# Patient Record
Sex: MALE | Race: WHITE | NOT HISPANIC OR LATINO | Employment: OTHER | URBAN - METROPOLITAN AREA
[De-identification: names, ages, dates, MRNs, and addresses within clinical notes are randomized per-mention and may not be internally consistent; named-entity substitution may affect disease eponyms.]

---

## 2021-07-06 ENCOUNTER — TELEPHONE (OUTPATIENT)
Dept: GASTROENTEROLOGY | Facility: CLINIC | Age: 71
End: 2021-07-06

## 2021-07-06 ENCOUNTER — PREP FOR PROCEDURE (OUTPATIENT)
Dept: GASTROENTEROLOGY | Facility: CLINIC | Age: 71
End: 2021-07-06

## 2021-07-06 DIAGNOSIS — K22.70 BARRETT'S ESOPHAGUS WITHOUT DYSPLASIA: Primary | ICD-10-CM

## 2021-07-06 NOTE — TELEPHONE ENCOUNTER
I called and scheduled patient for 10/5 EGD with Dr Re Weiner  He was due in 11/2020  I had to send eliquis clearance to Dr Hafsa Kebede in [place] [address] to get permission to stop 1 day prior to his procedure  Will call them in a couple of weeks if not received back

## 2021-07-06 NOTE — TELEPHONE ENCOUNTER
Spoke to pt whom is looking to schedule his recall EGD week of 10/3/21  I told pt that I will call him back to schedule as need to ask health questions that are only valid for a few months  Pt is due for a recall EGD with Dr Dennise Jackson for hx of Mares's

## 2021-07-21 ENCOUNTER — PREP FOR PROCEDURE (OUTPATIENT)
Dept: GASTROENTEROLOGY | Facility: CLINIC | Age: 71
End: 2021-07-21

## 2021-07-21 ENCOUNTER — TELEPHONE (OUTPATIENT)
Dept: GASTROENTEROLOGY | Facility: CLINIC | Age: 71
End: 2021-07-21

## 2021-07-21 DIAGNOSIS — Z86.010 HISTORY OF COLON POLYPS: Primary | ICD-10-CM

## 2021-07-21 NOTE — TELEPHONE ENCOUNTER
This is the pt we spoke about regarding his complaint of abd pain and ct results from ct done in [place]  I faxed the results to the [facility]

## 2021-07-21 NOTE — TELEPHONE ENCOUNTER
I will triage scheduling to set up colonoscopy at time of EGD with Dr Geraldo Saldana  I spoke to patient on phone the CT scan was done 6/09/2021 which showed wall thickening of the sigmoid colon suggestive of a component of chronic diverticulitis  Patient continues to have pain in left lower quadrant  Patient is mainly a resident of [place] but he does return to [place] to have his endoscope is performed by Dr Geraldo Saldana  Advised patient to follow up with his primary care physician in obtain repeat CT imaging to determine if he has diverticulitis  If patient has diverticulitis it should be treated by his primary care physician in [place]  We can still schedule patient for colonoscopy at time of EGD in October with Dr Geraldo Saldana  Thank you

## 2021-07-27 NOTE — TELEPHONE ENCOUNTER
Called and spoke with one of the receptionists that spoke with the nurse at Dr Karen Bateman office to confirm if they received clearance or not  They did not receive, had to refax  Will f/u in a couple of weeks

## 2021-08-10 NOTE — TELEPHONE ENCOUNTER
Called and spoke with Leandra Duran the nurse at Dr Raymundo Russo office  They will not clear him until he is seen for an in person appointment  I called patient and he has a stress test end of August and office visit first week of September  I will f/u in the second week of September if not received back

## 2021-08-17 ENCOUNTER — TELEMEDICINE (OUTPATIENT)
Dept: GASTROENTEROLOGY | Facility: CLINIC | Age: 71
End: 2021-08-17
Payer: MEDICARE

## 2021-08-17 DIAGNOSIS — R93.3 ABNORMAL CT SCAN, GASTROINTESTINAL TRACT: Primary | ICD-10-CM

## 2021-08-17 PROCEDURE — 99442 PR PHYS/QHP TELEPHONE EVALUATION 11-20 MIN: CPT | Performed by: INTERNAL MEDICINE

## 2021-08-17 NOTE — PROGRESS NOTES
Virtual Brief Visit    Verification of patient location:    Patient is located in the following state in which I hold an active license [place]      Assessment/Plan: patient desired to speak regarding abnormal CT scan  He was elsewhere in was noted to have on a CT scan done for left-sided pain some thickening of the sigmoid question chronic diverticulitis  He has no pain over in that area now  He had a follow-up CT scan showed some thickening of the cecum and extensive diverticulosis  With a long discussion  He has an upcoming endoscopy we will had his colonoscopy to that  Problem List Items Addressed This Visit     None                Reason for visit is   Chief Complaint   Patient presents with    Virtual Brief Visit        Encounter provider Darylene Melnick, MD    Provider located at [address]   [facility]  [facility] [id number] [phone]    Recent Visits  No visits were found meeting these conditions  Showing recent visits within past 7 days and meeting all other requirements  Today's Visits  Date Type Provider Dept   08/17/21 Telemedicine Darylene Melnick, MD [address]   Showing today's visits and meeting all other requirements  Future Appointments  No visits were found meeting these conditions  Showing future appointments within next 150 days and meeting all other requirements       After connecting through  A protected cell phone line and patient was informed that this is not a secure, HIPAA-compliant platform  He agrees to proceed  , the patient was identified by name and date of birth  Paty Goncalves was informed that this is a telemedicine visit and that the visit is being conducted through   [employer] and patient was informed that this is not a secure, HIPAA-compliant platform  He agrees to proceed  My office door was closed  No one else was in the room    He acknowledged consent and understanding of privacy and security of the platform  The patient has agreed to participate and understands he can discontinue the visit at any time  Patient is aware this is a billable service  Subjective    Paty Goncalves is a 69 y o  male   Was out of state had a CT scan the abdomen and pelvis showed some thickening of the sigmoid question chronic diverticulitis     A follow-up CT scan showed some thickening of the cecum  HPI     No past medical history on file  No past surgical history on file  No current outpatient medications on file  No current facility-administered medications for this visit  Allergies   Allergen Reactions    Brilinta [Ticagrelor] Rash       Review of Systems    There were no vitals filed for this visit  I spent 17 minutes directly with the patient during this visit    VIRTUAL VISIT DISCLAIMER      Paty Goncalves verbally agrees to participate in [facility]  Pt is aware that [facility] could be limited without vital signs or the ability to perform a full hands-on physical Clista Katalinall understands he or the provider may request at any time to terminate the video visit and request the patient to seek care or treatment in person

## 2021-09-14 NOTE — TELEPHONE ENCOUNTER
Tried calling about clearance and was transferred to the nurses line, was on hold  Will try again tomorrow

## 2021-09-15 NOTE — TELEPHONE ENCOUNTER
Called and spoke with Lois Gregorio at Dr [facility] office in [place]  Patient is scheduled for 9/17 for the stress test and they have the form to clear him  Will f/u next week if  not received back

## 2021-09-20 NOTE — TELEPHONE ENCOUNTER
Cardiac clearance faxed to [facility]  Pt clear and can hold eliquis 2 days  Please call pt  Thank you

## 2021-10-04 RX ORDER — ACETAMINOPHEN 325 MG/1
650 TABLET ORAL EVERY 6 HOURS PRN
COMMUNITY

## 2021-10-04 RX ORDER — AMITRIPTYLINE HYDROCHLORIDE 10 MG/1
10 TABLET, FILM COATED ORAL
COMMUNITY

## 2021-10-04 RX ORDER — LOPERAMIDE HYDROCHLORIDE 2 MG/1
2 CAPSULE ORAL 4 TIMES DAILY PRN
COMMUNITY

## 2021-10-04 RX ORDER — METOPROLOL SUCCINATE 25 MG/1
25 TABLET, EXTENDED RELEASE ORAL DAILY
COMMUNITY

## 2021-10-04 RX ORDER — MULTIVITAMIN
1 TABLET ORAL DAILY
COMMUNITY

## 2021-10-04 RX ORDER — ATORVASTATIN CALCIUM 80 MG/1
80 TABLET, FILM COATED ORAL DAILY
COMMUNITY

## 2021-10-04 RX ORDER — IBUPROFEN 600 MG/1
TABLET ORAL EVERY 6 HOURS PRN
COMMUNITY

## 2021-10-04 RX ORDER — TADALAFIL 20 MG/1
20 TABLET ORAL DAILY PRN
COMMUNITY

## 2021-10-04 RX ORDER — CHLORPHENIRAMINE MALEATE 4 MG/1
4 TABLET ORAL EVERY 6 HOURS PRN
COMMUNITY
End: 2022-03-03

## 2021-10-04 RX ORDER — TAMSULOSIN HYDROCHLORIDE 0.4 MG/1
0.4 CAPSULE ORAL
COMMUNITY

## 2021-10-04 RX ORDER — MULTIVIT-MIN/IRON/FOLIC ACID/K 18-600-40
CAPSULE ORAL
COMMUNITY

## 2021-10-04 RX ORDER — FAMOTIDINE 20 MG/1
20 TABLET, FILM COATED ORAL 2 TIMES DAILY
COMMUNITY
End: 2021-10-05 | Stop reason: HOSPADM

## 2021-10-04 RX ORDER — METOPROLOL SUCCINATE 50 MG/1
50 TABLET, EXTENDED RELEASE ORAL DAILY
COMMUNITY

## 2021-10-05 ENCOUNTER — HOSPITAL ENCOUNTER (OUTPATIENT)
Dept: GASTROENTEROLOGY | Facility: AMBULATORY SURGERY CENTER | Age: 71
Discharge: HOME | End: 2021-10-05
Payer: MEDICARE

## 2021-10-05 ENCOUNTER — ANESTHESIA (OUTPATIENT)
Dept: GASTROENTEROLOGY | Facility: AMBULATORY SURGERY CENTER | Age: 71
End: 2021-10-05

## 2021-10-05 ENCOUNTER — ANESTHESIA EVENT (OUTPATIENT)
Dept: GASTROENTEROLOGY | Facility: AMBULATORY SURGERY CENTER | Age: 71
End: 2021-10-05

## 2021-10-05 VITALS
HEART RATE: 53 BPM | RESPIRATION RATE: 18 BRPM | HEIGHT: 69 IN | DIASTOLIC BLOOD PRESSURE: 65 MMHG | OXYGEN SATURATION: 99 % | BODY MASS INDEX: 26.66 KG/M2 | TEMPERATURE: 96.7 F | WEIGHT: 180 LBS | SYSTOLIC BLOOD PRESSURE: 114 MMHG

## 2021-10-05 DIAGNOSIS — K22.70 BARRETT'S ESOPHAGUS WITHOUT DYSPLASIA: ICD-10-CM

## 2021-10-05 DIAGNOSIS — K57.92 DIVERTICULITIS: ICD-10-CM

## 2021-10-05 DIAGNOSIS — R10.30 LOWER ABDOMINAL PAIN: ICD-10-CM

## 2021-10-05 DIAGNOSIS — K21.00 GASTROESOPHAGEAL REFLUX DISEASE WITH ESOPHAGITIS WITHOUT HEMORRHAGE: Primary | ICD-10-CM

## 2021-10-05 DIAGNOSIS — Z86.010 HISTORY OF COLON POLYPS: ICD-10-CM

## 2021-10-05 PROBLEM — Z86.0101 HX OF ADENOMATOUS COLONIC POLYPS: Status: ACTIVE | Noted: 2021-10-05

## 2021-10-05 PROCEDURE — 45385 COLONOSCOPY W/LESION REMOVAL: CPT | Performed by: INTERNAL MEDICINE

## 2021-10-05 PROCEDURE — 43239 EGD BIOPSY SINGLE/MULTIPLE: CPT | Performed by: INTERNAL MEDICINE

## 2021-10-05 PROCEDURE — 00813 ANES UPR LWR GI NDSC PX: CPT | Performed by: NURSE ANESTHETIST, CERTIFIED REGISTERED

## 2021-10-05 PROCEDURE — 88305 TISSUE EXAM BY PATHOLOGIST: CPT | Performed by: PATHOLOGY

## 2021-10-05 PROCEDURE — 99100 ANES PT EXTEME AGE<1 YR&>70: CPT | Performed by: NURSE ANESTHETIST, CERTIFIED REGISTERED

## 2021-10-05 RX ORDER — SODIUM FLUORIDE 5 MG/ML
PASTE, DENTIFRICE DENTAL
COMMUNITY

## 2021-10-05 RX ORDER — LIDOCAINE HYDROCHLORIDE 20 MG/ML
INJECTION, SOLUTION EPIDURAL; INFILTRATION; INTRACAUDAL; PERINEURAL AS NEEDED
Status: DISCONTINUED | OUTPATIENT
Start: 2021-10-05 | End: 2021-10-05

## 2021-10-05 RX ORDER — PROPOFOL 10 MG/ML
INJECTION, EMULSION INTRAVENOUS AS NEEDED
Status: DISCONTINUED | OUTPATIENT
Start: 2021-10-05 | End: 2021-10-05

## 2021-10-05 RX ORDER — SODIUM CHLORIDE 9 MG/ML
30 INJECTION, SOLUTION INTRAVENOUS CONTINUOUS
Status: DISCONTINUED | OUTPATIENT
Start: 2021-10-05 | End: 2021-10-09 | Stop reason: HOSPADM

## 2021-10-05 RX ORDER — SODIUM CHLORIDE 9 MG/ML
20 INJECTION, SOLUTION INTRAVENOUS CONTINUOUS
Status: DISCONTINUED | OUTPATIENT
Start: 2021-10-05 | End: 2021-10-09 | Stop reason: HOSPADM

## 2021-10-05 RX ORDER — OMEPRAZOLE 20 MG/1
20 CAPSULE, DELAYED RELEASE ORAL DAILY
Qty: 30 CAPSULE | Refills: 3 | Status: SHIPPED | OUTPATIENT
Start: 2021-10-05 | End: 2022-01-17 | Stop reason: SDUPTHER

## 2021-10-05 RX ADMIN — PROPOFOL 30 MG: 10 INJECTION, EMULSION INTRAVENOUS at 08:14

## 2021-10-05 RX ADMIN — PROPOFOL 50 MG: 10 INJECTION, EMULSION INTRAVENOUS at 08:29

## 2021-10-05 RX ADMIN — PROPOFOL 30 MG: 10 INJECTION, EMULSION INTRAVENOUS at 08:38

## 2021-10-05 RX ADMIN — LIDOCAINE HYDROCHLORIDE 50 MG: 20 INJECTION, SOLUTION EPIDURAL; INFILTRATION; INTRACAUDAL; PERINEURAL at 08:30

## 2021-10-05 RX ADMIN — LIDOCAINE HYDROCHLORIDE 50 MG: 20 INJECTION, SOLUTION EPIDURAL; INFILTRATION; INTRACAUDAL; PERINEURAL at 08:05

## 2021-10-05 RX ADMIN — PROPOFOL 130 MG: 10 INJECTION, EMULSION INTRAVENOUS at 08:05

## 2021-10-05 RX ADMIN — PROPOFOL 20 MG: 10 INJECTION, EMULSION INTRAVENOUS at 08:18

## 2021-10-05 RX ADMIN — PROPOFOL 50 MG: 10 INJECTION, EMULSION INTRAVENOUS at 08:31

## 2021-10-05 RX ADMIN — PROPOFOL 30 MG: 10 INJECTION, EMULSION INTRAVENOUS at 08:23

## 2021-10-05 RX ADMIN — PROPOFOL 50 MG: 10 INJECTION, EMULSION INTRAVENOUS at 08:16

## 2021-10-05 RX ADMIN — PROPOFOL 50 MG: 10 INJECTION, EMULSION INTRAVENOUS at 08:11

## 2021-10-05 RX ADMIN — SODIUM CHLORIDE: 9 INJECTION, SOLUTION INTRAVENOUS at 07:59

## 2021-10-05 RX ADMIN — PROPOFOL 20 MG: 10 INJECTION, EMULSION INTRAVENOUS at 08:26

## 2021-10-05 RX ADMIN — PROPOFOL 50 MG: 10 INJECTION, EMULSION INTRAVENOUS at 08:33

## 2021-10-05 RX ADMIN — PROPOFOL 20 MG: 10 INJECTION, EMULSION INTRAVENOUS at 08:07

## 2022-01-16 DIAGNOSIS — K22.70 BARRETT'S ESOPHAGUS WITHOUT DYSPLASIA: ICD-10-CM

## 2022-01-16 DIAGNOSIS — K21.00 GASTROESOPHAGEAL REFLUX DISEASE WITH ESOPHAGITIS WITHOUT HEMORRHAGE: ICD-10-CM

## 2022-01-17 DIAGNOSIS — K22.70 BARRETT'S ESOPHAGUS WITHOUT DYSPLASIA: ICD-10-CM

## 2022-01-17 DIAGNOSIS — K21.00 GASTROESOPHAGEAL REFLUX DISEASE WITH ESOPHAGITIS WITHOUT HEMORRHAGE: ICD-10-CM

## 2022-01-17 RX ORDER — OMEPRAZOLE 20 MG/1
CAPSULE, DELAYED RELEASE ORAL
Qty: 30 CAPSULE | Refills: 3 | Status: SHIPPED | OUTPATIENT
Start: 2022-01-17 | End: 2022-04-04 | Stop reason: SDUPTHER

## 2022-01-17 RX ORDER — OMEPRAZOLE 20 MG/1
20 CAPSULE, DELAYED RELEASE ORAL DAILY
Qty: 90 CAPSULE | Refills: 3 | Status: SHIPPED | OUTPATIENT
Start: 2022-01-17 | End: 2022-03-03

## 2022-03-03 ENCOUNTER — TELEMEDICINE (OUTPATIENT)
Dept: GASTROENTEROLOGY | Facility: CLINIC | Age: 72
End: 2022-03-03
Payer: MEDICARE

## 2022-03-03 VITALS — WEIGHT: 183 LBS | HEIGHT: 69 IN | BODY MASS INDEX: 27.11 KG/M2

## 2022-03-03 DIAGNOSIS — R10.30 LOWER ABDOMINAL PAIN: Primary | ICD-10-CM

## 2022-03-03 DIAGNOSIS — R93.3 ABNORMAL CT SCAN, GASTROINTESTINAL TRACT: ICD-10-CM

## 2022-03-03 PROCEDURE — 1124F ACP DISCUSS-NO DSCNMKR DOCD: CPT | Performed by: INTERNAL MEDICINE

## 2022-03-03 PROCEDURE — 99213 OFFICE O/P EST LOW 20 MIN: CPT | Performed by: INTERNAL MEDICINE

## 2022-03-03 RX ORDER — LEVOCETIRIZINE DIHYDROCHLORIDE 5 MG/1
TABLET, FILM COATED ORAL
COMMUNITY
Start: 2021-12-01

## 2022-03-03 RX ORDER — DICYCLOMINE HCL 20 MG
TABLET ORAL
COMMUNITY
Start: 2022-02-21

## 2022-03-03 NOTE — PROGRESS NOTES
Virtual Regular Visit    Verification of patient location:    Patient is located in the following state in which I hold an active license [place]      Assessment/Plan:  Suspect a musculoskeletal component  Discussed possible evaluation by orthopedist   Kavitha Eagle with primary care consider it warm moist heat anti inflammatory and possibly some exercises  He will keep me posted of his progress  He is now down at [place]  Should condition worsen her persist please follow-up  Next colonoscopy is due in October of 2026  Problem List Items Addressed This Visit     None      Visit Diagnoses     Lower abdominal pain    -  Primary    Abnormal CT scan, gastrointestinal tract                   Reason for visit is   Chief Complaint   Patient presents with    Follow-up     lower left quadrant pain - pcp put patient on dyclomine     Virtual Regular Visit        Encounter provider Masoud Espana MD    Provider located at [address]      Recent Visits  No visits were found meeting these conditions  Showing recent visits within past 7 days and meeting all other requirements  Today's Visits  Date Type Provider Dept   03/03/22 Telemedicine Masoud Espana MD [address]   Showing today's visits and meeting all other requirements  Future Appointments  No visits were found meeting these conditions  Showing future appointments within next 150 days and meeting all other requirements       The patient was identified by name and date of birth  Jeff Bolañoskman was informed that this is a telemedicine visit and that the visit is being conducted through [address] [facility] and patient was informed that this is a secure, HIPAA-compliant platform  He agrees to proceed     My office door was closed  No one else was in the room    He acknowledged consent and understanding of privacy and security of the video platform  The patient has agreed to participate and understands they can discontinue the visit at any time  Patient is aware this is a billable service  Subjective  Og Seymour is a 66 y o  male with 8 months of some very vague pain to the right of his tuberosity of the ileum  Sometimes radiates to his groin  Brought on by certain motions  He exercises regularly but avoid using his abdominal muscles during that  He does upper body strength and runs  Most recent CT scan showed some asymmetry of the inguinal canals and thickened sigmoid  He did have a colonoscopy in October tubular adenoma was removed  Diverticulosis was noted  Next colonoscopy 5 years  Jairo Acevedo     Past Medical History:   Diagnosis Date    Abnormal finding on CT scan 5/2021 and 8/2021    thickening of colon wall    Mares's esophagus     Colon polyp     Diarrhea     intermittent, every week or two, takes Imodium    Enlarged prostate     GERD (gastroesophageal reflux disease)     Herniated disc, cervical     Hyperlipidemia     Hypertension     Irregular heart beat     afib    Seasonal allergies     Sleep apnea     uses cpap       Past Surgical History:   Procedure Laterality Date    ANGIOPLASTY  2017    3 stents       Current Outpatient Medications   Medication Sig Dispense Refill    acetaminophen (TYLENOL) 325 mg tablet Take 650 mg by mouth every 6 (six) hours as needed for mild pain      amitriptyline (ELAVIL) 10 mg tablet Take 10 mg by mouth daily at bedtime      apixaban (Eliquis) 5 mg Take 5 mg by mouth 2 (two) times a day      Ascorbic Acid (Vitamin C) 500 MG CAPS Take by mouth      Aspirin 81 MG CAPS Take by mouth      atorvastatin (LIPITOR) 80 mg tablet Take 80 mg by mouth daily      dicyclomine (BENTYL) 20 mg tablet       ibuprofen (MOTRIN) 600 mg tablet Take by mouth every 6 (six) hours as needed for mild pain      levocetirizine (XYZAL) 5 MG tablet       loperamide (IMODIUM) 2 mg capsule Take 2 mg by mouth 4 (four) times a day as needed for diarrhea      metoprolol succinate (TOPROL-XL) 25 mg 24 hr tablet Take 25 mg by mouth daily      metoprolol succinate (TOPROL-XL) 50 mg 24 hr tablet Take 50 mg by mouth daily      Multiple Vitamin (multivitamin) tablet Take 1 tablet by mouth daily      omeprazole (PriLOSEC) 20 mg delayed release capsule TAKE 1 CAPSULE BY MOUTH  DAILY 30 capsule 3    SODIUM FLUORIDE, DENTAL GEL, 1 1 % CREA Apply to teeth      tadalafil (CIALIS) 20 MG tablet Take 20 mg by mouth daily as needed for erectile dysfunction      tamsulosin (FLOMAX) 0 4 mg Take 0 4 mg by mouth daily with dinner       No current facility-administered medications for this visit  Allergies   Allergen Reactions    Brilinta [Ticagrelor] Rash       Review of Systems    Video Exam    Vitals:    03/03/22 0752   Weight: 83 kg (183 lb)   Height: 5' 9" (1 753 m)       Physical Exam     No apparent distress abdomen appears benig  I spent 11 minutes 31 seconds directly speaking with the patient    VIRTUAL VISIT DISCLAIMER      Juana Rebolledo verbally agrees to participate in [employer]  Pt is aware that [employer] could be limited without vital signs or the ability to perform a full hands-on physical Abduldo Carvajal understands he or the provider may request at any time to terminate the video visit and request the patient to seek care or treatment in person

## 2022-04-04 DIAGNOSIS — K22.70 BARRETT'S ESOPHAGUS WITHOUT DYSPLASIA: ICD-10-CM

## 2022-04-04 DIAGNOSIS — K21.00 GASTROESOPHAGEAL REFLUX DISEASE WITH ESOPHAGITIS WITHOUT HEMORRHAGE: ICD-10-CM

## 2022-04-04 RX ORDER — OMEPRAZOLE 20 MG/1
20 CAPSULE, DELAYED RELEASE ORAL DAILY
Qty: 30 CAPSULE | Refills: 3 | Status: SHIPPED | OUTPATIENT
Start: 2022-04-04 | End: 2022-07-24

## 2022-07-24 DIAGNOSIS — K22.70 BARRETT'S ESOPHAGUS WITHOUT DYSPLASIA: ICD-10-CM

## 2022-07-24 DIAGNOSIS — K21.00 GASTROESOPHAGEAL REFLUX DISEASE WITH ESOPHAGITIS WITHOUT HEMORRHAGE: ICD-10-CM

## 2022-07-24 RX ORDER — OMEPRAZOLE 20 MG/1
CAPSULE, DELAYED RELEASE ORAL
Qty: 30 CAPSULE | Refills: 11 | Status: SHIPPED | OUTPATIENT
Start: 2022-07-24

## 2023-06-17 DIAGNOSIS — K22.70 BARRETT'S ESOPHAGUS WITHOUT DYSPLASIA: ICD-10-CM

## 2023-06-17 DIAGNOSIS — K21.00 GASTROESOPHAGEAL REFLUX DISEASE WITH ESOPHAGITIS WITHOUT HEMORRHAGE: ICD-10-CM

## 2023-06-17 RX ORDER — OMEPRAZOLE 20 MG/1
CAPSULE, DELAYED RELEASE ORAL
Qty: 90 CAPSULE | Refills: 3 | Status: SHIPPED | OUTPATIENT
Start: 2023-06-17

## 2024-02-27 ENCOUNTER — TELEPHONE (OUTPATIENT)
Dept: SURGERY | Facility: CLINIC | Age: 74
End: 2024-02-27

## 2024-05-01 ENCOUNTER — OFFICE VISIT (OUTPATIENT)
Dept: GASTROENTEROLOGY | Facility: CLINIC | Age: 74
End: 2024-05-01
Payer: MEDICARE

## 2024-05-01 ENCOUNTER — TELEPHONE (OUTPATIENT)
Dept: GASTROENTEROLOGY | Facility: CLINIC | Age: 74
End: 2024-05-01

## 2024-05-01 VITALS
WEIGHT: 315 LBS | HEART RATE: 59 BPM | HEIGHT: 69 IN | SYSTOLIC BLOOD PRESSURE: 132 MMHG | BODY MASS INDEX: 46.65 KG/M2 | DIASTOLIC BLOOD PRESSURE: 66 MMHG

## 2024-05-01 DIAGNOSIS — Z80.0 FAMILY HISTORY OF COLON CANCER: ICD-10-CM

## 2024-05-01 DIAGNOSIS — Z86.010 HX OF ADENOMATOUS COLONIC POLYPS: ICD-10-CM

## 2024-05-01 DIAGNOSIS — K21.00 GASTROESOPHAGEAL REFLUX DISEASE WITH ESOPHAGITIS WITHOUT HEMORRHAGE: ICD-10-CM

## 2024-05-01 DIAGNOSIS — K22.70 BARRETT'S ESOPHAGUS WITHOUT DYSPLASIA: ICD-10-CM

## 2024-05-01 DIAGNOSIS — R19.7 DIARRHEA, UNSPECIFIED TYPE: Primary | ICD-10-CM

## 2024-05-01 DIAGNOSIS — K21.9 GASTROESOPHAGEAL REFLUX DISEASE, UNSPECIFIED WHETHER ESOPHAGITIS PRESENT: ICD-10-CM

## 2024-05-01 PROCEDURE — 99204 OFFICE O/P NEW MOD 45 MIN: CPT | Performed by: NURSE PRACTITIONER

## 2024-05-01 RX ORDER — OMEPRAZOLE 20 MG/1
20 CAPSULE, DELAYED RELEASE ORAL DAILY
Qty: 90 CAPSULE | Refills: 3 | Status: SHIPPED | OUTPATIENT
Start: 2024-05-01

## 2024-05-01 RX ORDER — AMLODIPINE BESYLATE 5 MG/1
5 TABLET ORAL DAILY
COMMUNITY
Start: 2024-04-13

## 2024-05-01 NOTE — PROGRESS NOTES
[facility] - Outpatient Follow-up Note  Kalpesh Alonzo 73 y.o. male MRN: [id number]  Encounter: [id number]          ASSESSMENT AND PLAN:      1. Diarrhea, unspecified type  Pt reports hx of chronic diarrhea with associated cramping for the last 20 years.  He has tried 2 medications in the past which didn't work, but doesn't recall what they were. He did have improvement with Imodium and has been taking 1 mg BID with formed BMs, but over the last 1-2 years is having recurrent diarrhea every 5-6 days which is now yellow, foul smelling, and oily & different than his prior diarrhea. Denies any nocturnal symptoms, rectal bleeding, weight loss, bloating, nausea . Reports dairy free/gluten free trials as well as probiotics were unsuccessful.     -Obtain celiac panel, TSH, fecal elastase, giardia  -Recent CBC normal in Nov 2023, CMP normal w/ the exception of slightly elevated Bilirubin  -Will schedule colonoscopy to evaluate for microscopic colitis   -If workup benign, consider Xifaxan for treatment of IBS-D  -Pt will trial Metamucil daily, avoidance of artificial sugars, & Low fodmap diet in the interim  -     Celiac Disease Panel; Future  -     TSH, 3rd generation with Free T4 reflex; Future  -     Pancreatic elastase, fecal; Future  -     Giardia antigen; Future  -     Colonoscopy; Future; Expected date: 05/01/2024    2. Gastroesophageal reflux disease, unspecified whether esophagitis present  3. Juarez's esophagus without dysplasia  Controlled on Prilosec 20mg daily with breakthrough symptoms once a month. Due for EGD for juarez's surveillance this year which we will schedule.  -     EGD; Future; Expected date: 05/01/2024    4. Hx of adenomatous colonic polyps  History of TA polyp removed in October 2021 with recall recommended in 5 years. Due to diarrhea with change in bowel habit will schedule this earlier with EGD.  -     Colonoscopy; Future; Expected date: 05/01/2024    5. Family  history of colon cancer in father  Diagnosed at age 45  -     Colonoscopy; Future; Expected date: 05/01/2024        ______________________________________________________________________    SUBJECTIVE:  Kalpesh Alonzo is a 73 y.o. male with history of HLD, HTN, A-fib on Eliquis, CAD with stent placement 2017, BPH, PABLO, GERD, Mares's esophagus, cervical disc disease presenting for evaluation of diarrhea.     Has had diarrhea for 20 years, was prescribed 2 medications, and they didn't work. Since then, he's been taking Imodium 1 mg in the morning and 1 mg in the afternoon which has seemingly worked for him. He is now having diarrhea every 5-6 days. Describes the diarrhea as foul smelling, yellow, oily. Gets abdominal cramping with the diarrhea which resolves afterwards. Denies blood in the stool, nocturnal symptoms, unintended weight loss. Drinks Coke zero 1-2 times a day, 1-2 cups of coffee a day. Typically only eats one meal a day cheese and crackers with cocktails in the afternoon and then meat & veggies for dinner. Denies any recent antibiotic use.     His last EGD was in October 2021 showing antritis and short segment Mares's.  He had a fundic polyp removed and had a fundic diverticulum.  Repeat was recommended in 3 years. Biopsies c/w chronic inactive gastritis (no h.pylori), fundic gland polyp, EG junction w/o intestinal metaplasia, esophageal body w/o eosinophils.  Colonoscopy done at that time with diverticulosis, internal hemorrhoids, and a tubular adenoma removed and repeat recommended in 5 years.    Drinks 2-3 ounces of whiskey, 1 beer, or 2 glasses of wine a day  No tobacco use or recreational drug use  + family hx of colon cancer in father at age 45. No history of IBD or celiac disease that he's aware of    Follows with Dr. Taylor JEREZ cardiology          REVIEW OF SYSTEMS IS OTHERWISE NEGATIVE.      Historical Information   Past Medical History:   Diagnosis Date    Abnormal finding on CT scan  "5/2021 and 8/2021    thickening of colon wall    Mares's esophagus     Colon polyp     Diarrhea     intermittent, every week or two, takes Imodium    Enlarged prostate     GERD (gastroesophageal reflux disease)     Herniated disc, cervical     Hyperlipidemia     Hypertension     Irregular heart beat     afib    Seasonal allergies     Sleep apnea     uses cpap     Past Surgical History:   Procedure Laterality Date    ANGIOPLASTY  2017    3 stents     Social History   Social History     Substance and Sexual Activity   Alcohol Use Yes    Comment: 2 drinks/day     Social History     Substance and Sexual Activity   Drug Use Never     Social History     Tobacco Use   Smoking Status Never   Smokeless Tobacco Never     Family History   Problem Relation Age of Onset    Cancer Father 45        colon    Cancer Paternal Uncle 65        throat       Meds/Allergies       Current Outpatient Medications:     acetaminophen (TYLENOL) 325 mg tablet    amitriptyline (ELAVIL) 10 mg tablet    amLODIPine (NORVASC) 5 mg tablet    apixaban (Eliquis) 5 mg    Ascorbic Acid (Vitamin C) 500 MG CAPS    Aspirin 81 MG CAPS    atorvastatin (LIPITOR) 80 mg tablet    levocetirizine (XYZAL) 5 MG tablet    loperamide (IMODIUM) 2 mg capsule    metoprolol succinate (TOPROL-XL) 50 mg 24 hr tablet    Multiple Vitamin (multivitamin) tablet    omeprazole (PriLOSEC) 20 mg delayed release capsule    SODIUM FLUORIDE, DENTAL GEL, 1.1 % CREA    tadalafil (CIALIS) 20 MG tablet    tamsulosin (FLOMAX) 0.4 mg    dicyclomine (BENTYL) 20 mg tablet    ibuprofen (MOTRIN) 600 mg tablet    metoprolol succinate (TOPROL-XL) 25 mg 24 hr tablet    Allergies   Allergen Reactions    Brilinta [Ticagrelor] Rash    Iodine - Food Allergy Rash     Allergy to IV Contrast, upper body rash/swelling. Would need benadryl before administration of contrast. Pt denies airway symptoms.           Objective     Blood pressure 132/66, pulse 59, height 5' 9\" (1.753 m), weight (!) 359 kg (791 lb " 12.8 oz). Body mass index is 116.93 kg/m².      PHYSICAL EXAM:      General Appearance:   Alert, cooperative, no distress   HEENT:   Normocephalic, atraumatic, anicteric.     Neck:  Supple, symmetrical, trachea midline   Lungs:   Clear to auscultation bilaterally; no rales, rhonchi or wheezing; respirations unlabored    Heart::   Regular rate and rhythm; no murmur.   Abdomen:   Soft, non-tender, non-distended; normal bowel sounds; no masses, no organomegaly    Genitalia:   Deferred    Rectal:   Deferred    Extremities:  No cyanosis, clubbing or edema    Skin:  No jaundice, rashes, or lesions    Lymph nodes:  No palpable cervical lymphadenopathy        Lab Results:   No visits with results within 1 Day(s) from this visit.   Latest known visit with results is:   Hospital Outpatient Visit on 10/05/2021   Component Date Value    Case Report 10/05/2021                      Value:Surgical Pathology Report                         Case: [id number]                                   Authorizing Provider:  Sandoval Solares MD           Collected:           10/05/2021 0822              Ordering Location:     [facility] Received:            10/05/2021 2103                                     [place]                                                                  Pathologist:           Jean Pierre Prieto MD                                                         Specimens:   A) - Stomach, ANTRUM...cold bx..check for Hpy                                                       B) - Polyp, Stomach/Small Intestine, FUNDUS...cold bx...polyp x1                                    C) - Esophagus, EG JUNCTION...cold bx...Barretts                                                    D) - Esophagus, BODY OF ESOPH...cold bx..check for EOE                                              E) - Esophagus, UPPER ESOPH...cold bx...inlet patch                                                                           F) - Large Intestine,  Transverse Colon, TRANSVERSE..cold snare...polyp x1                  Final Diagnosis 10/05/2021                      Value:This result contains rich text formatting which cannot be displayed here.    Additional Information 10/05/2021                      Value:This result contains rich text formatting which cannot be displayed here.    Synoptic Checklist 10/05/2021                      Value:  (COLON/RECTUM POLYP FORM - GI - F)                                                                                                                 :    Adenoma(s)      Gross Description 10/05/2021                      Value:This result contains rich text formatting which cannot be displayed here.         Radiology Results:   No results found.

## 2024-05-01 NOTE — PATIENT INSTRUCTIONS
-Decrease use of artificial sugars  -Start Metamucil daily to add bulk to your stool  -You can continue the imodium     Low FODMAPs Diet     You need a diet that limits, but does not eliminate foods that contain: lactose, fructose, fructans, galactans, and sugar alcohols (polyols).  This is often referred to as a low FODMAPs diet as it is low in fermetable oligo-, di-, and monosaccharides and polyls (FODMAPs).  The low FODMAPs diet will help minimize symptoms, such as bloating, cramping, burping, flatulence, and constipation and/or diarrhea, that are often associated with Irritable Bowel Syndrome (IBS).  Because there are different levels of intolerance, you need to eliminate foods high in FODMAPs for 6-8 weeks and then gradually reintroduce foods to identify bothersome foods.  Reintroduce one food every four days with a 2-week break between bothersome foods.  The end result is to identify the threshold that you are able to consume FODMAP containing foods without causing bothersome GI symptoms.     Type of Food High in FODMAPs Low in FODMAPs   Milk -Milk: Cow, Sheep, Goat, Soy  -Creamy soups made with    milk  -Evaporated milk  -Sweetened condensed milk -Milk: [place], Coconut, Hazelnut, Hemp, Rice  -Lactose free cow's milk  -Lactose free joaquín  -Lactose free ice cream    (non-dairy alternatives)  -Purchase lactase enzyme to  make your own evaporated or  condensed milk if needed   Yogurt -Cow's milk yogurt (Greek yogurt is lowest in FODMAPs)  -Soy yogurt -Coconut milk yogurt   Cheese -Cottage cheese  -Ricotta cheese  -Marscapone cheese -Hard cheeses including  cheddar, Swiss, brie, blue  cheese, mozzarella, parmesan,  and feta  -No more than 2 tablespoons ricotta or cottage cheese  -Lactose free cottage cheese   Dairy-based  Condiments -Sour cream  -Whipping cream -Butter  -Half and half  -Cream cheese   Dairy-based desserts -Ice cream  -Frozen yogurt  -Sherbet -Sorbet from FODMAPs friendly fruit   Fruit -Apples,  Pears  -Cherries, Raspberries,  Blackberries  -Watermelon  -Nectarines, White peaches, Apricots, Plums  -Peaches  -Prunes  -Sidon, Papaya  -Persimmon  -Orange juice  -Canned fruit  -Large portions of any fruit -Banana  -Blueberries, Strawberries  -Cantaloupe, Honeydew  -Grapefruit, Lemon, Lime  -Grapes  -Kiwi  -Pineapple  -Rhubarb  -Tangelos  - <1/4 avocado  - <1 tablespoon dried fruit     Limit consumption to one low FODMAPs fruit per meal.  Consume ripe fruit. (Firm, less- ripe fruit contains more fructose.)   Vegetables -Artichokes  -Asparagus  -Sugar snap peas  -Cabbage  -Onions  -Shallot  -Amol  -Onion and garlic salt  powders  -Garlic  -Cauliflower  -Mushrooms  -Pumpkin  -Green Peppers -Bok marilyn, Bean sprouts  -Red bell pepper  -Lettuce, Spinach  -Carrots  -Chives, Spring onion (green part    Only)  -Cucumber  -Eggplant  -Green beans  -Tomato  -Potatoes  -Garlic infused oil; purchase    flavored oil or saute onion and    garlic in oil and then discard    onion and garlic  -Water chestnuts  -<1 stick celery  -<1/2 cup sweet potato, broccoli,    [place] sprouts, butternut    squash, fennel  -<1/3 cup green peas  -<10 snow peas         Grains -Wheat  -Rye  -Barley-large quantities  -Spelt -Brown rice  -Oats, oat bran  -Quinoa  -Corn  -Gluten-free bread, cereals,    pastas and crackers without    honey, apple/pear juice, agave    or HFCS  -Namaste Food Perfect Flour  Blend or Casimiro Gm Gluten Free  -Multi-Purpose Flour   Legumes -Chickpeas, Hummus  -Kidney beans, Baked beans  -Edamame  -Lentils  -Soy milk -Tofu  -Peanuts   Nuts and Seeds -Pistachios -10-15 max or 1-2 tablespoons of Almonds, Macadamia, Pecans,  Pine nuts, Walnuts, Pumpkin  Seed, Sesame seeds, and  Sunflower seeds   Sweeteners -Honey  -Agave  -High fructose corn syrup  -Sorbitol, Mannitol, Xylitol,  Maltitol  -Splenda (may alter friendly  gut ivana) -Sugar  -Glucose, Sucrose  -Pure maple syrup  -Aspartame   Additives -Inulin, found in yogurt,  joaquín, cereals, and other foods with added fiber  -FOS    (fructo- oligosaccharides)  -Sugar alcohols (see sweeteners)  -Chicory root    Alcohol -Rum -Wine, Beer  -Vodka, Gin  -Limit to one serving as all  alcohol is a gastric irritant   Protein-rich food  -Fish, Chicken, Turkey, Eggs,  Meat   Fat-rich food  -Olive and canola oil  -Olives  -<1/4 avocado      Sample Low FODMAPs Diet Menu:     Breakfast  [place] Corn Flakes or oats, with rice or almond milk, banana and 1 tablespoon sliced almonds  Diggs's or [employer] oatmeal with 1 tablespoon dried fruit and nuts  Quinoa flakes with rice or almond milk, 3/4 cup strawberries, and 1 tablespoon pecans     Lunch  Taj's white bread sandwich with sliced turkey, lettuce or spinach leaves, tomato, sliced cheddar cheese and [place] lactose-free vanilla yogurt, 1/2 cup blueberries and baby carrots  Stir cannon with brown rice or rice noodles, chicken, shrimp, or beef, peppers and bok marilyn, ask for no onion or garlic and the sauce on the side  Fruit salad with 1 cup (total) low FODMAP fruits, kiwi, strawberries and blueberries, spinach salad with lemon dressing and cherry tomatoes, and brown rice cakes with natural almond butter     Snack  Glutino pretzels or Blue Prabha [place] Nut thins and mozzarella string cheese  Hard boiled egg and cherry tomatoes  Pumpkin seeds  Brown rice cakes with natural peanut butter  Banana and handful almonds  1 stick celery with natural almond butter or,  Carrots and red pepper dipped in tahini     Dinner  Grilled chicken or salmon with baked sweet potato with olive oil or butter, sauteed spinach and red peppers seasoned with green parts of onion, salt, pepper, handful of pine nuts and olive oil, and a kiwi  Bere's baked potato and a side salad with chicken, bring your own homemade salad dressing that does not contain garlic or onion  [place]

## 2024-05-01 NOTE — TELEPHONE ENCOUNTER
Scheduled date of EGD/colonoscopy (as of today): 8/8/24  Physician performing EGD/colonoscopy: Dr. Higgins  Location of EGD/colonoscopy:   Desired bowel prep reviewed with patient: miralax  Instructions reviewed with patient by: tl given at appt  Clearances:      Eliquis- 2 day hold  Dr. Jazzy JEREZ

## 2024-07-25 ENCOUNTER — TELEPHONE (OUTPATIENT)
Dept: GASTROENTEROLOGY | Facility: CLINIC | Age: 74
End: 2024-07-25

## 2024-07-25 NOTE — TELEPHONE ENCOUNTER
---- Message -----   From: Jackelyn Gamez LPN   Sent: 7/25/2024  10:14 AM EDT   To: [facility]   Subject: Eliquis hold                                     Unable to tell if approval was obtained for patient to hold Eliquis 2 days prior to procedure. Patient also is unaware of any hold time. Please clarify and notify patient, thank you!     I sent cardiac clearance to Dr Vazquez's office , will await a response. Sb

## 2024-08-07 RX ORDER — LIDOCAINE HYDROCHLORIDE 10 MG/ML
0.5 INJECTION, SOLUTION EPIDURAL; INFILTRATION; INTRACAUDAL; PERINEURAL ONCE AS NEEDED
Status: CANCELLED | OUTPATIENT
Start: 2024-08-07

## 2024-08-07 RX ORDER — SODIUM CHLORIDE, SODIUM LACTATE, POTASSIUM CHLORIDE, CALCIUM CHLORIDE 600; 310; 30; 20 MG/100ML; MG/100ML; MG/100ML; MG/100ML
125 INJECTION, SOLUTION INTRAVENOUS CONTINUOUS
Status: CANCELLED | OUTPATIENT
Start: 2024-08-07

## 2024-08-08 ENCOUNTER — HOSPITAL ENCOUNTER (OUTPATIENT)
Dept: GASTROENTEROLOGY | Facility: HOSPITAL | Age: 74
Setting detail: OUTPATIENT SURGERY
End: 2024-08-08
Payer: MEDICARE

## 2024-08-08 ENCOUNTER — ANESTHESIA (OUTPATIENT)
Dept: GASTROENTEROLOGY | Facility: HOSPITAL | Age: 74
End: 2024-08-08

## 2024-08-08 ENCOUNTER — ANESTHESIA EVENT (OUTPATIENT)
Dept: GASTROENTEROLOGY | Facility: HOSPITAL | Age: 74
End: 2024-08-08

## 2024-08-08 VITALS
OXYGEN SATURATION: 94 % | WEIGHT: 181.8 LBS | SYSTOLIC BLOOD PRESSURE: 102 MMHG | HEART RATE: 57 BPM | DIASTOLIC BLOOD PRESSURE: 57 MMHG | TEMPERATURE: 97.8 F | BODY MASS INDEX: 26.93 KG/M2 | HEIGHT: 69 IN | RESPIRATION RATE: 16 BRPM

## 2024-08-08 DIAGNOSIS — K44.9 HIATAL HERNIA: Primary | ICD-10-CM

## 2024-08-08 DIAGNOSIS — Z86.010 HX OF ADENOMATOUS COLONIC POLYPS: ICD-10-CM

## 2024-08-08 DIAGNOSIS — Z80.0 FAMILY HISTORY OF COLON CANCER: ICD-10-CM

## 2024-08-08 DIAGNOSIS — K21.9 GASTROESOPHAGEAL REFLUX DISEASE, UNSPECIFIED WHETHER ESOPHAGITIS PRESENT: ICD-10-CM

## 2024-08-08 DIAGNOSIS — K22.70 BARRETT'S ESOPHAGUS WITHOUT DYSPLASIA: ICD-10-CM

## 2024-08-08 DIAGNOSIS — R19.7 DIARRHEA, UNSPECIFIED TYPE: ICD-10-CM

## 2024-08-08 PROCEDURE — G0105 COLORECTAL SCRN; HI RISK IND: HCPCS | Performed by: INTERNAL MEDICINE

## 2024-08-08 PROCEDURE — 88305 TISSUE EXAM BY PATHOLOGIST: CPT | Performed by: STUDENT IN AN ORGANIZED HEALTH CARE EDUCATION/TRAINING PROGRAM

## 2024-08-08 PROCEDURE — 43239 EGD BIOPSY SINGLE/MULTIPLE: CPT | Performed by: INTERNAL MEDICINE

## 2024-08-08 RX ORDER — PROPOFOL 10 MG/ML
INJECTION, EMULSION INTRAVENOUS AS NEEDED
Status: DISCONTINUED | OUTPATIENT
Start: 2024-08-08 | End: 2024-08-08

## 2024-08-08 RX ORDER — SODIUM CHLORIDE, SODIUM LACTATE, POTASSIUM CHLORIDE, CALCIUM CHLORIDE 600; 310; 30; 20 MG/100ML; MG/100ML; MG/100ML; MG/100ML
INJECTION, SOLUTION INTRAVENOUS CONTINUOUS PRN
Status: DISCONTINUED | OUTPATIENT
Start: 2024-08-08 | End: 2024-08-08

## 2024-08-08 RX ORDER — LIDOCAINE HYDROCHLORIDE 20 MG/ML
INJECTION, SOLUTION EPIDURAL; INFILTRATION; INTRACAUDAL; PERINEURAL AS NEEDED
Status: DISCONTINUED | OUTPATIENT
Start: 2024-08-08 | End: 2024-08-08

## 2024-08-08 RX ADMIN — PROPOFOL 50 MG: 10 INJECTION, EMULSION INTRAVENOUS at 13:01

## 2024-08-08 RX ADMIN — PROPOFOL 150 MG: 10 INJECTION, EMULSION INTRAVENOUS at 12:48

## 2024-08-08 RX ADMIN — SODIUM CHLORIDE, SODIUM LACTATE, POTASSIUM CHLORIDE, AND CALCIUM CHLORIDE: .6; .31; .03; .02 INJECTION, SOLUTION INTRAVENOUS at 12:23

## 2024-08-08 RX ADMIN — LIDOCAINE HYDROCHLORIDE 100 MG: 20 INJECTION, SOLUTION EPIDURAL; INFILTRATION; INTRACAUDAL; PERINEURAL at 12:48

## 2024-08-08 RX ADMIN — PROPOFOL 80 MCG/KG/MIN: 10 INJECTION, EMULSION INTRAVENOUS at 12:49

## 2024-08-08 RX ADMIN — PROPOFOL 50 MG: 10 INJECTION, EMULSION INTRAVENOUS at 13:07

## 2024-08-08 NOTE — H&P
"History and Physical - [facility]  Kalpesh Alonzo 73 y.o. male MRN: [id number]                  HPI: Kalpesh Alonzo is a 73 y.o. year old male who presents for history of colon polyps, family history of colon cancer, GERD      REVIEW OF SYSTEMS: Per the HPI, and otherwise unremarkable.    Historical Information   Past Medical History:   Diagnosis Date    Abnormal finding on CT scan 5/2021 and 8/2021    thickening of colon wall    Maers's esophagus     Colon polyp     Diarrhea     intermittent, every week or two, takes Imodium    Enlarged prostate     GERD (gastroesophageal reflux disease)     Herniated disc, cervical     Hyperlipidemia     Hypertension     Irregular heart beat     afib    Seasonal allergies     Sleep apnea     uses cpap     Past Surgical History:   Procedure Laterality Date    ANGIOPLASTY  2017    3 stents     Social History   Social History     Substance and Sexual Activity   Alcohol Use Yes    Comment: 2 drinks/day \"3 ounces a day\"     Social History     Substance and Sexual Activity   Drug Use Never     Social History     Tobacco Use   Smoking Status Never   Smokeless Tobacco Never     Family History   Problem Relation Age of Onset    Cancer Father 45        colon    Cancer Paternal Uncle 65        throat       Meds/Allergies       Current Outpatient Medications:     acetaminophen (TYLENOL) 325 mg tablet    amLODIPine (NORVASC) 5 mg tablet    apixaban (Eliquis) 5 mg    Ascorbic Acid (Vitamin C) 500 MG CAPS    Aspirin 81 MG CAPS    atorvastatin (LIPITOR) 80 mg tablet    metoprolol succinate (TOPROL-XL) 50 mg 24 hr tablet    Multiple Vitamin (multivitamin) tablet    omeprazole (PriLOSEC) 20 mg delayed release capsule    tadalafil (CIALIS) 20 MG tablet    tamsulosin (FLOMAX) 0.4 mg    amitriptyline (ELAVIL) 10 mg tablet    ibuprofen (MOTRIN) 600 mg tablet    levocetirizine (XYZAL) 5 MG tablet    loperamide (IMODIUM) 2 mg capsule    metoprolol succinate (TOPROL-XL) 25 mg 24 " "hr tablet    SODIUM FLUORIDE, DENTAL GEL, 1.1 % CREA  No current facility-administered medications for this encounter.    Facility-Administered Medications Ordered in Other Encounters:     lactated ringers infusion, , Intravenous, Continuous PRN, New Bag at 08/08/24 1223    Allergies   Allergen Reactions    Brilinta [Ticagrelor] Rash    Iodine - Food Allergy Rash     Allergy to IV Contrast, upper body rash/swelling. Would need benadryl before administration of contrast. Pt denies airway symptoms.       Objective     /67   Pulse 60   Temp (!) 97.4 °F (36.3 °C) (Temporal)   Resp 20   Ht 5' 9\" (1.753 m)   Wt 82.5 kg (181 lb 12.8 oz)   SpO2 100%   BMI 26.85 kg/m²       PHYSICAL EXAM    Gen: NAD  Head: NCAT  CV: RRR  CHEST: Clear  ABD: soft, NT/ND  EXT: no edema      ASSESSMENT/PLAN:  This is a 73 y.o. year old male here for colonoscopy, EGD, and he is stable and optimized for his procedure.        "

## 2024-08-08 NOTE — ANESTHESIA PREPROCEDURE EVALUATION
Procedure:  COLONOSCOPY  EGD    Relevant Problems   CARDIO   (+) Hyperlipidemia   (+) Hypertension      GI/HEPATIC   (+) GERD (gastroesophageal reflux disease)      PULMONARY   (+) Sleep apnea        Physical Exam    Airway    Mallampati score: III  TM Distance: <3 FB  Neck ROM: full     Dental   No notable dental hx     Cardiovascular  Rhythm: regular, Rate: normal, Cardiovascular exam normal    Pulmonary  Pulmonary exam normal Breath sounds clear to auscultation    Other Findings        Anesthesia Plan  ASA Score- 2     Anesthesia Type- IV sedation with anesthesia with ASA Monitors.         Additional Monitors:     Airway Plan:     Comment: Discussed risks/benefits, including medication reactions, awareness, aspiration, and serious/life threatening complications.    Plan to maintain native airway with IVGA, monitored with EtCO2.       Plan Factors-Exercise tolerance (METS): >4 METS.    Chart reviewed.    Patient summary reviewed.      Patient instructed to abstain from smoking on day of procedure. Patient did not smoke on day of surgery.            Induction- intravenous.    Postoperative Plan-         Informed Consent- Anesthetic plan and risks discussed with patient.  I personally reviewed this patient with the CRNA. Discussed and agreed on the Anesthesia Plan with the CRNA..

## 2024-08-08 NOTE — ANESTHESIA POSTPROCEDURE EVALUATION
Post-Op Assessment Note    CV Status:  Stable  Pain Score: 0    Pain management: adequate       Mental Status:  Sleepy and arousable   Hydration Status:  Stable   PONV Controlled:  Controlled   Airway Patency:  Patent     Post Op Vitals Reviewed: Yes    No anethesia notable event occurred.    Staff: CRNA               BP   102/56   Temp      Pulse  63   Resp   13   SpO2   98

## 2024-08-13 PROCEDURE — 88305 TISSUE EXAM BY PATHOLOGIST: CPT | Performed by: STUDENT IN AN ORGANIZED HEALTH CARE EDUCATION/TRAINING PROGRAM

## 2024-08-22 ENCOUNTER — HOSPITAL ENCOUNTER (OUTPATIENT)
Dept: RADIOLOGY | Facility: HOSPITAL | Age: 74
Discharge: HOME | End: 2024-08-22
Attending: INTERNAL MEDICINE
Payer: MEDICARE

## 2024-08-22 ENCOUNTER — TELEPHONE (OUTPATIENT)
Dept: GASTROENTEROLOGY | Facility: CLINIC | Age: 74
End: 2024-08-22

## 2024-08-22 DIAGNOSIS — K44.9 HIATAL HERNIA: ICD-10-CM

## 2024-08-22 PROCEDURE — 74240 X-RAY XM UPR GI TRC 1CNTRST: CPT

## 2024-08-22 NOTE — TELEPHONE ENCOUNTER
----- Message from Dalton Higgins MD sent at 8/22/2024  8:22 AM EDT -----  Please call the patient regarding his result.  Biopsies show Mares's esophagus with no evidence of progression toward cancer.  Repeat EGD in 3 years

## 2024-08-22 NOTE — RESULT ENCOUNTER NOTE
Please call the patient regarding his result.  Biopsies show Mares's esophagus with no evidence of progression toward cancer.  Repeat EGD in 3 years

## 2025-05-18 DIAGNOSIS — K22.70 BARRETT'S ESOPHAGUS WITHOUT DYSPLASIA: ICD-10-CM

## 2025-05-18 DIAGNOSIS — K21.00 GASTROESOPHAGEAL REFLUX DISEASE WITH ESOPHAGITIS WITHOUT HEMORRHAGE: ICD-10-CM

## 2025-05-19 RX ORDER — OMEPRAZOLE 20 MG/1
20 CAPSULE, DELAYED RELEASE ORAL DAILY
Qty: 90 CAPSULE | Refills: 0 | Status: SHIPPED | OUTPATIENT
Start: 2025-05-19

## 2025-05-30 ENCOUNTER — OFFICE VISIT (OUTPATIENT)
Age: 75
End: 2025-05-30
Payer: MEDICARE

## 2025-05-30 VITALS
HEIGHT: 69 IN | DIASTOLIC BLOOD PRESSURE: 70 MMHG | BODY MASS INDEX: 27.96 KG/M2 | HEART RATE: 61 BPM | SYSTOLIC BLOOD PRESSURE: 133 MMHG | WEIGHT: 188.8 LBS

## 2025-05-30 DIAGNOSIS — Z86.0101 HX OF ADENOMATOUS COLONIC POLYPS: ICD-10-CM

## 2025-05-30 DIAGNOSIS — K22.70 BARRETT'S ESOPHAGUS WITHOUT DYSPLASIA: ICD-10-CM

## 2025-05-30 DIAGNOSIS — K21.9 GASTROESOPHAGEAL REFLUX DISEASE, UNSPECIFIED WHETHER ESOPHAGITIS PRESENT: Primary | ICD-10-CM

## 2025-05-30 PROCEDURE — G2211 COMPLEX E/M VISIT ADD ON: HCPCS | Performed by: NURSE PRACTITIONER

## 2025-05-30 PROCEDURE — 99213 OFFICE O/P EST LOW 20 MIN: CPT | Performed by: NURSE PRACTITIONER

## 2025-05-30 RX ORDER — OMEPRAZOLE 20 MG/1
20 CAPSULE, DELAYED RELEASE ORAL DAILY
Qty: 90 CAPSULE | Refills: 3 | Status: SHIPPED | OUTPATIENT
Start: 2025-07-05 | End: 2025-10-03

## 2025-05-30 NOTE — ASSESSMENT & PLAN NOTE
EGD done 8/8/2024 showed salmon-colored mucosa with islands in the esophagus.  Biopsy positive for Mares's esophagus.  Orders:    omeprazole (PriLOSEC) 20 mg delayed release capsule; Take 1 capsule (20 mg total) by mouth daily Do not start before July 5, 2025.  Surveillance EGD for Mares's esophagus due 8/2027

## 2025-05-30 NOTE — PROGRESS NOTES
Name: Kalpesh Alonzo      : 1950      MRN: [id number]  Encounter Provider: ELADIA Valle  Encounter Date: 2025   Encounter department: [facility]  :  Assessment & Plan  Gastroesophageal reflux disease, unspecified whether esophagitis present  Patient has history of GERD.  Patient reports GERD symptoms are well-controlled except for some rare episodes of acid reflux depending on what he eats.  EGD done 2024 showed salmon-colored mucosa with islands in the esophagus 2 cm type II hiatal hernia, duodenum appeared normal.  Orders:    omeprazole (PriLOSEC) 20 mg delayed release capsule; Take 1 capsule (20 mg total) by mouth daily Do not start before 2025.  Avoid trigger foods  Mares's esophagus without dysplasia  EGD done 2024 showed salmon-colored mucosa with islands in the esophagus.  Biopsy positive for Mares's esophagus.  Orders:    omeprazole (PriLOSEC) 20 mg delayed release capsule; Take 1 capsule (20 mg total) by mouth daily Do not start before 2025.  Surveillance EGD for Mares's esophagus due 2027  Hx of adenomatous colonic polyps  Patient has history adenomatous colon polyps.  Colonoscopy done 2024 showed no colon polyps.  Diverticulosis of moderate severity in sigmoid colon.   Surveillance colonoscopy due 2029       Follow-up in 1 year    History of Present Illness   HPI  Kalpesh Alonzo is a 74 y.o. male who presents to office for follow-up for Mares's esophagus, GERD, and history of adenomatous colon polyps. Patient reports rare episodes of acid reflux when he eats something he should not otherwise his GERD symptoms are well-controlled.  Patient denies nausea, vomiting, heartburn, dysphagia, epigastric or abdominal pain.  Patient denies blood in stool, blood from rectal area, or black tarry stool.  Bowel patterns are normal.  Abdomen exam benign no abdominal tenderness or guarding.  Reviewed results of  colonoscopy/EGD and biopsies with patient    Colonoscopy done 8/8/2024 showed no colon polyps.  Diverticulosis of moderate severity in sigmoid colon. EGD done 8/8/2024 showed salmon-colored mucosa with islands in the esophagus 2 cm type II hiatal hernia, duodenum appeared normal.  Biopsy positive for Mares's esophagus negative for dysplasia or carcinoma.    Review of Systems   Constitutional:  Negative for chills and fever.   HENT:  Negative for ear pain and sore throat.    Eyes:  Negative for pain and visual disturbance.   Respiratory:  Negative for cough and shortness of breath.    Cardiovascular:  Negative for chest pain and palpitations.   Gastrointestinal:  Negative for abdominal pain and vomiting.   Genitourinary:  Negative for dysuria and hematuria.   Musculoskeletal:  Negative for arthralgias and back pain.   Skin:  Negative for color change and rash.   Neurological:  Negative for seizures and syncope.   All other systems reviewed and are negative.    Medical History Reviewed by provider this encounter:  Tobacco  Allergies  Meds  Problems  Med Hx  Surg Hx  Fam Hx     .  Past Medical History   Past Medical History[1]  Past Surgical History[2]  Family History[3]   reports that he has never smoked. He has never used smokeless tobacco. He reports current alcohol use. He reports that he does not use drugs.  Current Outpatient Medications   Medication Instructions    acetaminophen (TYLENOL) 650 mg, Every 6 hours PRN    amitriptyline (ELAVIL) 10 mg, Daily at bedtime    amLODIPine (NORVASC) 5 mg, Daily    apixaban (ELIQUIS) 5 mg, 2 times daily    Ascorbic Acid (Vitamin C) 500 MG CAPS Take by mouth    Aspirin 81 MG CAPS Take by mouth    atorvastatin (LIPITOR) 80 mg, Daily    ibuprofen (MOTRIN) 600 mg tablet Every 6 hours PRN    levocetirizine (XYZAL) 5 MG tablet No dose, route, or frequency recorded.    loperamide (IMODIUM) 2 mg, 4 times daily PRN    metoprolol succinate (TOPROL-XL) 25 mg, Daily     "metoprolol succinate (TOPROL-XL) 50 mg, Daily    Multiple Vitamin (multivitamin) tablet 1 tablet, Daily    [START ON 7/5/2025] omeprazole (PRILOSEC) 20 mg, Oral, Daily    SODIUM FLUORIDE, DENTAL GEL, 1.1 % CREA Apply to teeth    tadalafil (CIALIS) 20 mg, Daily PRN    tamsulosin (FLOMAX) 0.4 mg, Daily with dinner   Allergies[4]   Medications Ordered Prior to Encounter[5]   Social History[6]     Objective   /70 (Patient Position: Sitting, Cuff Size: Standard)   Pulse 61   Ht 5' 9\" (1.753 m)   Wt 85.6 kg (188 lb 12.8 oz)   BMI 27.88 kg/m²      Physical Exam  Vitals and nursing note reviewed.   Constitutional:       General: He is not in acute distress.     Appearance: He is well-developed.   HENT:      Head: Normocephalic and atraumatic.     Eyes:      Conjunctiva/sclera: Conjunctivae normal.       Cardiovascular:      Rate and Rhythm: Normal rate and regular rhythm.      Pulses: Normal pulses.      Heart sounds: Normal heart sounds. No murmur heard.  Pulmonary:      Effort: Pulmonary effort is normal. No respiratory distress.      Breath sounds: Normal breath sounds. No stridor. No wheezing, rhonchi or rales.   Abdominal:      General: Bowel sounds are normal. There is no distension.      Palpations: Abdomen is soft. There is no mass.      Tenderness: There is no abdominal tenderness. There is no guarding or rebound.     Musculoskeletal:         General: No swelling.      Cervical back: Neck supple.      Right lower leg: No edema.      Left lower leg: No edema.     Skin:     General: Skin is warm and dry.      Capillary Refill: Capillary refill takes less than 2 seconds.      Coloration: Skin is not jaundiced or pale.     Neurological:      Mental Status: He is alert and oriented to person, place, and time.     Psychiatric:         Mood and Affect: Mood normal.                [1]   Past Medical History:  Diagnosis Date    Abnormal finding on CT scan 5/2021 and 8/2021    thickening of colon wall    Mares's " esophagus     Colon polyp     Diarrhea     intermittent, every week or two, takes Imodium    Enlarged prostate     GERD (gastroesophageal reflux disease)     Herniated disc, cervical     Hyperlipidemia     Hypertension     Irregular heart beat     afib    Seasonal allergies     Sleep apnea     uses cpap   [2]   Past Surgical History:  Procedure Laterality Date    ANGIOPLASTY  2017    3 stents    COLONOSCOPY      UPPER GASTROINTESTINAL ENDOSCOPY     [3]   Family History  Problem Relation Name Age of Onset    Colon cancer Father      Throat cancer Paternal Uncle     [4]   Allergies  Allergen Reactions    Brilinta [Ticagrelor] Rash    Iodine - Food Allergy Rash     Allergy to IV Contrast, upper body rash/swelling. Would need benadryl before administration of contrast. Pt denies airway symptoms.   [5]   Current Outpatient Medications on File Prior to Visit   Medication Sig Dispense Refill    acetaminophen (TYLENOL) 325 mg tablet Take 650 mg by mouth every 6 (six) hours as needed for mild pain      amLODIPine (NORVASC) 5 mg tablet Take 5 mg by mouth in the morning.      apixaban (Eliquis) 5 mg Take 5 mg by mouth in the morning and 5 mg in the evening.      Ascorbic Acid (Vitamin C) 500 MG CAPS Take by mouth      Aspirin 81 MG CAPS Take by mouth      atorvastatin (LIPITOR) 80 mg tablet Take 80 mg by mouth in the morning.      loperamide (IMODIUM) 2 mg capsule Take 2 mg by mouth as needed in the morning and 2 mg as needed at noon and 2 mg as needed in the evening and 2 mg as needed before bedtime for diarrhea.      metoprolol succinate (TOPROL-XL) 25 mg 24 hr tablet Take 25 mg by mouth daily      metoprolol succinate (TOPROL-XL) 50 mg 24 hr tablet Take 50 mg by mouth in the morning.      Multiple Vitamin (multivitamin) tablet Take 1 tablet by mouth in the morning.      SODIUM FLUORIDE, DENTAL GEL, 1.1 % CREA Apply to teeth      tadalafil (CIALIS) 20 MG tablet Take 20 mg by mouth daily as needed for erectile dysfunction    "   tamsulosin (FLOMAX) 0.4 mg Take 0.4 mg by mouth daily with dinner      [DISCONTINUED] omeprazole (PriLOSEC) 20 mg delayed release capsule TAKE 1 CAPSULE BY MOUTH DAILY 90 capsule 0    amitriptyline (ELAVIL) 10 mg tablet Take 10 mg by mouth daily at bedtime (Patient not taking: Reported on 5/30/2025)      ibuprofen (MOTRIN) 600 mg tablet Take by mouth every 6 (six) hours as needed for mild pain (Patient not taking: Reported on 5/1/2024)      levocetirizine (XYZAL) 5 MG tablet  (Patient not taking: Reported on 5/30/2025)       No current facility-administered medications on file prior to visit.   [6]   Social History  Tobacco Use    Smoking status: Never    Smokeless tobacco: Never   Vaping Use    Vaping status: Never Used   Substance and Sexual Activity    Alcohol use: Yes     Comment: 2 drinks/day \"3 ounces a day\"    Drug use: Never     "

## 2025-05-30 NOTE — ASSESSMENT & PLAN NOTE
Patient has history of GERD.  Patient reports GERD symptoms are well-controlled except for some rare episodes of acid reflux depending on what he eats.  EGD done 8/8/2024 showed salmon-colored mucosa with islands in the esophagus 2 cm type II hiatal hernia, duodenum appeared normal.  Orders:    omeprazole (PriLOSEC) 20 mg delayed release capsule; Take 1 capsule (20 mg total) by mouth daily Do not start before July 5, 2025.  Avoid trigger foods

## 2025-05-30 NOTE — ASSESSMENT & PLAN NOTE
Patient has history adenomatous colon polyps.  Colonoscopy done 8/8/2024 showed no colon polyps.  Diverticulosis of moderate severity in sigmoid colon.   Surveillance colonoscopy due 8/2029